# Patient Record
Sex: MALE | Race: WHITE | NOT HISPANIC OR LATINO
[De-identification: names, ages, dates, MRNs, and addresses within clinical notes are randomized per-mention and may not be internally consistent; named-entity substitution may affect disease eponyms.]

---

## 2024-01-10 ENCOUNTER — NON-APPOINTMENT (OUTPATIENT)
Age: 64
End: 2024-01-10

## 2024-03-22 ENCOUNTER — NON-APPOINTMENT (OUTPATIENT)
Age: 64
End: 2024-03-22

## 2024-03-23 ENCOUNTER — APPOINTMENT (OUTPATIENT)
Dept: ORTHOPEDIC SURGERY | Facility: CLINIC | Age: 64
End: 2024-03-23
Payer: COMMERCIAL

## 2024-03-23 VITALS — BODY MASS INDEX: 27.35 KG/M2 | HEIGHT: 75 IN | WEIGHT: 220 LBS

## 2024-03-23 PROBLEM — Z00.00 ENCOUNTER FOR PREVENTIVE HEALTH EXAMINATION: Status: ACTIVE | Noted: 2024-03-23

## 2024-03-23 PROCEDURE — 99203 OFFICE O/P NEW LOW 30 MIN: CPT

## 2024-03-23 NOTE — PHYSICAL EXAM
[Left] : left hand [Metacarpal] : metacarpal [MCP Joint] : MCP joint [3rd] : 3rd [2nd] : 2nd [MP Joint] : MP joint [PIP Joint] : PIP joint [de-identified] : Good strength with discomfort [FreeTextEntry3] : Mild swelling with no ecchymosis or erythema to second and third metacarpal head/MCP [] : good capillary refill in all fingers

## 2024-03-23 NOTE — DISCUSSION/SUMMARY
[de-identified] : Reviewed x-rays of left hand [facility] :no acute fracture dislocation. Chronic appearing deformity of the distal phalanx of the index finger. Mild spurring of the proximal interphalangeal joints of the second and third digits. Soft tissue swelling about the index finger.  Discussed with patient detail that he has bone contusion of left hand.  Bone contusions generally can last 4 to 6 weeks first 2 weeks are generally worse.  Advised rest, ice/heat, warm SOAKS, RANGE OF MOTION EXERCISES.  BUDDY TAPE PATIENT' THIRD AND FOURTH DIGITS FOR SUPPORT.  Patient will continue taking Motrin/Tylenol as needed for pain.  Patient will follow-up in 3 to 4 weeks if still experiencing pain, otherwise as needed.  Patient understands agrees with plan.

## 2024-03-23 NOTE — HISTORY OF PRESENT ILLNESS
[de-identified] : Patient is a 63-year-old male here for evaluation of left hand injury.  Patient states yesterday he was lifting up his garage door.  Patient states that the garage door fell and hit the top of his hand.  Patient states that he was immediate pain.  Patient states that earlier this morning he went to urgent care, x-rays were taken.  Patient was told that he did not have a fracture, patient was placed in finger splint and told to follow-up with orthopedics for further evaluation.  Denies numbness or tingling.

## 2024-04-17 ENCOUNTER — APPOINTMENT (OUTPATIENT)
Dept: ORTHOPEDIC SURGERY | Facility: CLINIC | Age: 64
End: 2024-04-17
Payer: COMMERCIAL

## 2024-04-17 ENCOUNTER — RESULT CHARGE (OUTPATIENT)
Age: 64
End: 2024-04-17

## 2024-04-17 VITALS — BODY MASS INDEX: 27.35 KG/M2 | HEIGHT: 75 IN | WEIGHT: 220 LBS

## 2024-04-17 DIAGNOSIS — S69.92XA UNSPECIFIED INJURY OF LEFT WRIST, HAND AND FINGER(S), INITIAL ENCOUNTER: ICD-10-CM

## 2024-04-17 PROCEDURE — 73130 X-RAY EXAM OF HAND: CPT | Mod: LT

## 2024-04-17 PROCEDURE — 99213 OFFICE O/P EST LOW 20 MIN: CPT

## 2024-04-17 NOTE — PHYSICAL EXAM
[de-identified] : Physical exam of his left hand: He has some swelling of the digit.  Swelling on the dorsal side of the hand.  Nontender over the distal radius or distal ulna.  Negative anatomical snuffbox tenderness.  All of his pain is localized over the third metacarpal head.  He cannot make a full fist secondary to swelling.  He can flex and extend at the MCP, PIP, and DIP joint of the digits.  His sensory and motor are intact.

## 2024-04-17 NOTE — DATA REVIEWED
[FreeTextEntry1] : I reviewed the x-rays from [facility] urgent care which show no acute displaced fractures or bony abnormalities.  These x-rays were taken on 3/23/2024.  3 x-ray views repeated in the office today of his left hand show no acute or healing fractures or bony abnormalities.  There are some chronic degenerative changes of the digits.

## 2024-04-17 NOTE — DISCUSSION/SUMMARY
[de-identified] : Patient is about 3 weeks status post a left hand contusion.  He understands his injuries could take several weeks to resolve.  He understands that swelling can even last for several months.  He will continue to work on making a full tight fist.  He will soak his hand in some Epsom salt and warm water to help with the swelling as well.  I will see him back in 3 to 4 weeks for repeat evaluation.  No x-rays needed at that appointment.  All questions were answered today.

## 2024-05-08 ENCOUNTER — APPOINTMENT (OUTPATIENT)
Dept: ORTHOPEDIC SURGERY | Facility: CLINIC | Age: 64
End: 2024-05-08

## 2024-05-15 NOTE — HISTORY OF PRESENT ILLNESS
"125/56 (BP Location: Right arm, Patient Position: Lying)   Pulse 60   Temp 36.7 °C (98 °F) (Oral)   Resp 15   Ht 5' 10" (1.778 m)   Wt 93 kg (205 lb)   SpO2 (!) 94%   BMI 29.41 kg/m²     " [de-identified] : Patient is a 63-year-old male here for repeat evaluation of his left hand.  He states that about 3 weeks ago he fell into his garage with a closed fist.  He has been having pain and swelling since then.  There are some mild improvement but it still continues to hurt him.  There is also continued swelling which limits his range of motion and use of the hand. 12-Nov-2021